# Patient Record
Sex: MALE | Race: BLACK OR AFRICAN AMERICAN | URBAN - METROPOLITAN AREA
[De-identification: names, ages, dates, MRNs, and addresses within clinical notes are randomized per-mention and may not be internally consistent; named-entity substitution may affect disease eponyms.]

---

## 2021-07-29 ENCOUNTER — WEB ENCOUNTER (OUTPATIENT)
Dept: URBAN - METROPOLITAN AREA CLINIC 105 | Facility: CLINIC | Age: 36
End: 2021-07-29

## 2021-07-29 ENCOUNTER — OFFICE VISIT (OUTPATIENT)
Dept: URBAN - METROPOLITAN AREA CLINIC 105 | Facility: CLINIC | Age: 36
End: 2021-07-29
Payer: SELF-PAY

## 2021-07-29 DIAGNOSIS — K62.89 ANAL PAIN: ICD-10-CM

## 2021-07-29 DIAGNOSIS — K60.3 PERIANAL FISTULA: ICD-10-CM

## 2021-07-29 DIAGNOSIS — K92.1 HEMATOCHEZIA: ICD-10-CM

## 2021-07-29 PROCEDURE — 99203 OFFICE O/P NEW LOW 30 MIN: CPT | Performed by: INTERNAL MEDICINE

## 2021-07-29 NOTE — HPI-TODAY'S VISIT:
Pt says that four years ago he had some issues with his anus. was seen at [facility] and had some type of surgery. not sure if it was an infection or hemorrhoid. - he is getting mucous coming out of the rectum and also bleeding. sometimes he gets issues with constipation. he has to wear a pad sometimes b/c it drains into his underwear.

## 2021-07-29 NOTE — PHYSICAL EXAM GASTROINTESTINAL
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

## 2021-09-29 ENCOUNTER — OFFICE VISIT (OUTPATIENT)
Dept: URBAN - METROPOLITAN AREA SURGERY CENTER 16 | Facility: SURGERY CENTER | Age: 36
End: 2021-09-29

## 2021-10-18 ENCOUNTER — CLAIMS CREATED FROM THE CLAIM WINDOW (OUTPATIENT)
Dept: URBAN - METROPOLITAN AREA CLINIC 4 | Facility: CLINIC | Age: 36
End: 2021-10-18
Payer: SELF-PAY

## 2021-10-18 ENCOUNTER — OFFICE VISIT (OUTPATIENT)
Dept: URBAN - METROPOLITAN AREA SURGERY CENTER 16 | Facility: SURGERY CENTER | Age: 36
End: 2021-10-18
Payer: SELF-PAY

## 2021-10-18 DIAGNOSIS — K50.00 CROHN'S DISEASE OF SMALL INTESTINE WITHOUT COMPLICATIONS: ICD-10-CM

## 2021-10-18 DIAGNOSIS — K62.3 RECTAL PROLAPSE: ICD-10-CM

## 2021-10-18 DIAGNOSIS — D12.4 ADENOMA OF DESCENDING COLON: ICD-10-CM

## 2021-10-18 DIAGNOSIS — K50.80 CROHN'S COLITIS: ICD-10-CM

## 2021-10-18 DIAGNOSIS — K63.89 BACTERIAL OVERGROWTH SYNDROME: ICD-10-CM

## 2021-10-18 DIAGNOSIS — K63.89 POLYP, HYPERPLASTIC: ICD-10-CM

## 2021-10-18 DIAGNOSIS — D12.5 ADENOMA OF SIGMOID COLON: ICD-10-CM

## 2021-10-18 DIAGNOSIS — K92.1 ACUTE MELENA: ICD-10-CM

## 2021-10-18 DIAGNOSIS — D12.5 BENIGN NEOPLASM OF SIGMOID COLON: ICD-10-CM

## 2021-10-18 PROCEDURE — 88313 SPECIAL STAINS GROUP 2: CPT | Performed by: PATHOLOGY

## 2021-10-18 PROCEDURE — 88305 TISSUE EXAM BY PATHOLOGIST: CPT | Performed by: PATHOLOGY

## 2021-10-18 PROCEDURE — 45380 COLONOSCOPY AND BIOPSY: CPT | Performed by: INTERNAL MEDICINE

## 2021-10-18 PROCEDURE — 88342 IMHCHEM/IMCYTCHM 1ST ANTB: CPT | Performed by: PATHOLOGY

## 2021-10-18 PROCEDURE — 45385 COLONOSCOPY W/LESION REMOVAL: CPT | Performed by: INTERNAL MEDICINE

## 2021-10-18 PROCEDURE — G8907 PT DOC NO EVENTS ON DISCHARG: HCPCS | Performed by: INTERNAL MEDICINE

## 2021-11-01 ENCOUNTER — DASHBOARD ENCOUNTERS (OUTPATIENT)
Age: 36
End: 2021-11-01

## 2021-11-01 ENCOUNTER — OFFICE VISIT (OUTPATIENT)
Dept: URBAN - METROPOLITAN AREA CLINIC 105 | Facility: CLINIC | Age: 36
End: 2021-11-01
Payer: SELF-PAY

## 2021-11-01 VITALS
SYSTOLIC BLOOD PRESSURE: 128 MMHG | BODY MASS INDEX: 23.07 KG/M2 | HEART RATE: 83 BPM | DIASTOLIC BLOOD PRESSURE: 84 MMHG | TEMPERATURE: 97 F | HEIGHT: 68 IN | WEIGHT: 152.2 LBS

## 2021-11-01 DIAGNOSIS — K52.89 (LYMPHOCYTIC) MICROSCOPIC COLITIS: ICD-10-CM

## 2021-11-01 DIAGNOSIS — K50.018 CROHN'S DISEASE OF SMALL INTESTINE WITH OTHER COMPLICATION: ICD-10-CM

## 2021-11-01 DIAGNOSIS — K60.3 PERIANAL FISTULA: ICD-10-CM

## 2021-11-01 PROBLEM — 56689002: Status: ACTIVE | Noted: 2021-11-01

## 2021-11-01 PROBLEM — 733533008: Status: ACTIVE | Noted: 2021-11-01

## 2021-11-01 PROCEDURE — 99214 OFFICE O/P EST MOD 30 MIN: CPT | Performed by: INTERNAL MEDICINE

## 2021-11-01 RX ORDER — CERTOLIZUMAB PEGOL 200 MG/ML
2 ML INJECTION, SOLUTION SUBCUTANEOUS
Qty: 1 KIT | Refills: 0 | OUTPATIENT
Start: 2021-11-01 | End: 2021-11-30

## 2021-11-01 RX ORDER — CERTOLIZUMAB PEGOL 200 MG/ML
1 ML INJECTION, SOLUTION SUBCUTANEOUS
Qty: 2 | Refills: 6 | OUTPATIENT

## 2021-11-01 NOTE — HPI-TODAY'S VISIT:
the patient comes to the office today for follow-up.  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  He had surgery for this years ago.  The underlying question was whether not he had   Crohn's disease.  I did colonoscopy and sure enough he had multiple aphthous a in the terminal ileum with biopsies consistent with chronic inflammatory bowel disease.  He comes today to discuss treatment options

## 2021-11-03 LAB
HEP B CORE AB, TOT: NEGATIVE
QUANTIFERON CRITERIA: (no result)
QUANTIFERON INCUBATION: (no result)
QUANTIFERON MITOGEN VALUE: >10
QUANTIFERON NIL VALUE: 0.04
QUANTIFERON TB1 AG VALUE: 0.18
QUANTIFERON TB2 AG VALUE: 0.44
QUANTIFERON-TB GOLD PLUS: POSITIVE